# Patient Record
Sex: FEMALE | URBAN - METROPOLITAN AREA
[De-identification: names, ages, dates, MRNs, and addresses within clinical notes are randomized per-mention and may not be internally consistent; named-entity substitution may affect disease eponyms.]

---

## 2020-08-11 ENCOUNTER — APPOINTMENT (RX ONLY)
Dept: URBAN - METROPOLITAN AREA CLINIC 38 | Facility: CLINIC | Age: 35
Setting detail: DERMATOLOGY
End: 2020-08-11

## 2020-08-11 DIAGNOSIS — L65.9 NONSCARRING HAIR LOSS, UNSPECIFIED: ICD-10-CM

## 2020-08-11 PROCEDURE — ? OTHER

## 2020-08-11 PROCEDURE — 99202 OFFICE O/P NEW SF 15 MIN: CPT

## 2020-08-11 NOTE — HPI: HAIR LOSS
Previous Labs: Yes
How Severe Is Your Hair Loss?: moderate
When Were The Labs Drawn? (Drawn...): 07/2020
Lab Details: Normal labs
Additional History: Pt said clumps of her hair falls out

## 2020-08-17 ENCOUNTER — NEW PATIENT (OUTPATIENT)
Age: 35
End: 2020-08-17

## 2020-08-17 DIAGNOSIS — G43.B0: ICD-10-CM

## 2020-08-17 DIAGNOSIS — H52.13: ICD-10-CM

## 2020-08-17 PROCEDURE — 92015 DETERMINE REFRACTIVE STATE: CPT

## 2020-08-17 PROCEDURE — 99203 OFFICE O/P NEW LOW 30 MIN: CPT

## 2020-08-17 ASSESSMENT — TONOMETRY
OS_IOP_MMHG: 17
OD_IOP_MMHG: 20

## 2020-08-17 ASSESSMENT — KERATOMETRY
OS_K2POWER_DIOPTERS: 43.5
OS_AXISANGLE2_DEGREES: 127
OD_AXISANGLE_DEGREES: 20
OD_K2POWER_DIOPTERS: 43.25
OD_AXISANGLE2_DEGREES: 110
OS_AXISANGLE_DEGREES: 37
OD_K1POWER_DIOPTERS: 43
OS_K1POWER_DIOPTERS: 43.25

## 2020-08-17 ASSESSMENT — VISUAL ACUITY
OD_CC: 20/25-2
OS_CC: 20/30

## 2023-05-02 NOTE — PROCEDURE: OTHER
Note Text (......Xxx Chief Complaint.): This diagnosis correlates with the
Other (Free Text): I explained to the patient that the cause of her hair loss is likely correlated with whatever underlying disease is causing her multiple symptoms. Diagnosis and treatment of the underlying disease will likely offer her the best hope of reversal. In the meantime she should begin treatment with Rogaine and continue her appointments with her other doctors to try to find an accurate diagnosis.
Detail Level: Detailed
Mirvaso Counseling: Mirvaso is a topical medication which can decrease superficial blood flow where applied. Side effects are uncommon and include stinging, redness and allergic reactions.